# Patient Record
Sex: MALE | Race: BLACK OR AFRICAN AMERICAN | NOT HISPANIC OR LATINO | Employment: FULL TIME | URBAN - METROPOLITAN AREA
[De-identification: names, ages, dates, MRNs, and addresses within clinical notes are randomized per-mention and may not be internally consistent; named-entity substitution may affect disease eponyms.]

---

## 2024-06-21 ENCOUNTER — HOSPITAL ENCOUNTER (EMERGENCY)
Facility: CLINIC | Age: 59
Discharge: HOME | End: 2024-06-21
Attending: EMERGENCY MEDICINE | Admitting: EMERGENCY MEDICINE
Payer: COMMERCIAL

## 2024-06-21 VITALS
TEMPERATURE: 97.6 F | OXYGEN SATURATION: 99 % | HEART RATE: 56 BPM | SYSTOLIC BLOOD PRESSURE: 195 MMHG | DIASTOLIC BLOOD PRESSURE: 99 MMHG | RESPIRATION RATE: 18 BRPM

## 2024-06-21 DIAGNOSIS — R40.0 SOMNOLENCE: ICD-10-CM

## 2024-06-21 PROCEDURE — 99283 EMERGENCY DEPT VISIT LOW MDM: CPT

## 2024-06-21 PROCEDURE — 99282 EMERGENCY DEPT VISIT SF MDM: CPT

## 2024-06-21 ASSESSMENT — COLUMBIA-SUICIDE SEVERITY RATING SCALE - C-SSRS
6. HAVE YOU EVER DONE ANYTHING, STARTED TO DO ANYTHING, OR PREPARED TO DO ANYTHING TO END YOUR LIFE?: NO
1. IN THE PAST MONTH, HAVE YOU WISHED YOU WERE DEAD OR WISHED YOU COULD GO TO SLEEP AND NOT WAKE UP?: NO
2. HAVE YOU ACTUALLY HAD ANY THOUGHTS OF KILLING YOURSELF IN THE PAST MONTH?: NO

## 2024-06-21 ASSESSMENT — ACTIVITIES OF DAILY LIVING (ADL): ADLS_ACUITY_SCORE: 35

## 2024-06-21 NOTE — ED PROVIDER NOTES
Emergency Department Note      History of Present Illness     Chief Complaint  Altered Mental Status    HPI  Syed Parekh is a 58 year old male who presents to the ER for syncope. 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 He drank earlier today, smoked some weed,  and has not eaten anything. He took no other drugs. Syed denies symptoms before and after syncope. He recalls this happening 12 years ago. Of note, patient was at a hospital in [place] a month ago for an abscess that went away with medication.     Independent Historian  None    Review of External Notes  None available  Past Medical History   Medical History and Problem List  The patient denies any significant past medical history.    Medications  The patient is not currently taking any regular medications.   Physical Exam   Patient Vitals for the past 24 hrs:   BP Temp Temp src Pulse Resp SpO2   06/21/24 1745 (!) 195/99 97.6  F (36.4  C) Temporal 56 18 99 %     Physical Exam  General: Disheveled middle-age gentleman resting comfortably in the chair, interacting appropriately.    Eye:  Pupils are equal, round, and reactive.  Extraocular movements intact.    ENT:  No rhinorrhea.  Moist mucus membranes.  Normal tongue and tonsil.    Cardiac:  Regular rate and rhythm.  No murmurs, gallops, or rubs.    Pulmonary:  Clear to auscultation bilaterally.  No wheezes, rales, or rhonchi.    Abdomen:  Positive bowel sounds.  Abdomen is soft and non-distended, without focal tenderness.    Musculoskeletal:  Normal movement of all extremities without evidence for deficit.    Skin:  Warm and dry without rashes.    Neurologic:  Non-focal exam without asymmetric weakness or numbness.     Psychiatric:  Normal affect with appropriate interaction with examiner.    Diagnostics   Lab Results   Labs Ordered and Resulted from Time of ED Arrival to Time of ED Departure - No data to display    Imaging  No orders  "to display       Independent Interpretation  None  ED Course    Medications Administered  Medications - No data to display      Discussion of Management  None    Social Determinants of Health adding to complexity of care  Homelessness/Housing Insecurity    ED Course  ED Course as of 06/21/24 2340   Fri Jun 21, 2024   4668 I obtained the history and examined the patient as noted above.      Medical Decision Making / Diagnosis   CMS Diagnoses: None    MIPS     None    MDM  Syed Parekh is a 58 year old male presenting to us from a local [place] where the patient was found sleeping on the ground.  The patient states that he has a job there and remembers moving a car from 1 place to another.  He is not entirely clear how he ended up on the ground.  However, he does not feel that he likely suffered any trauma.  He did admit to drinking beer and smoking \"L blunt\" earlier today.  He came to immediately, but there was concern that this was a syncopal spell and therefore EMS was called and the patient was transported to us.    The time of my assessment, the patient is resting comfortably in the chair.  He has no complaints for me.  He is frustrated that he is here.  He states that \"I feel absolutely fine.\"  He states that he had blood work done \"in [place]\" last month and everything is \"fine.\"  I explained to him that I would recommend an EKG and basic blood test to make sure this did not represent a cardiac dysrhythmia, anemia, electrolyte disturbance, etc.  The patient is adamant with me that he does not want to have any further testing done today.  I do believe that he has capacity to understand that I have significant diagnostic uncertainty regarding his visit today.  However, he has every right to refuse care and I will discharge him with his otherwise reassuring vital signs and normal physical exam.  He was advised to return to us immediately if he changes his mind about a workup or if he has any other emergent " concerns.    Disposition  The patient was discharged.     ICD-10 Codes:    ICD-10-CM    1. Somnolence  R40.0            Discharge Medications  There are no discharge medications for this patient.        Scribe Disclosure:  I, Arnaud Dempsey, am serving as a scribe at 6:01 PM on 6/21/2024 to document services personally performed by Trierweiler, Chad A, MD based on my observations and the provider's statements to me.        Trierweiler, Chad A, MD  06/21/24 4019

## 2024-06-21 NOTE — ED TRIAGE NOTES
[place] em[place] and found asleep/unconscious in car wash not in a car. No trauma noted or injuries. Alert oriented x4          HTN and bradycardia to 49 present for ems

## 2024-06-21 NOTE — DISCHARGE INSTRUCTIONS
As discussed, I am unsure as to why you passed out today.  I recommended checking an EKG and blood tests and observing you for a period of time.  Instead, you have elected not to pursue any testing, desiring to leave the emergency department.  Please do not hesitate to return to us if you change your mind about having a further workup for this to try to determine what occurred.      Discharge Instructions  Syncope    Syncope (fainting) is a sudden, short loss of consciousness (passing out spell). People will usually fall to the ground when they faint or slump over if seated.  People may also shake when this happens, and it can sometimes be difficult to tell the difference between syncope and a seizure. At this time, your provider does not find a reason to suspect that your fainting spell is a sign of anything dangerous or life-threatening.  However, sometimes the signs of serious illness do not show up right away.     Generally, every Emergency Department visit should have a follow-up clinic visit with either a primary or a specialty clinic/provider. Please follow-up as instructed by your emergency provider today.    Return to the Emergency Department if:  You faint again.   You have any significant bleeding.  You have chest pain or a fast or irregular heartbeat.  You feel short of breath.  You cough up any blood.  You have abdominal (belly) pain or unusual back pain.  You have ongoing vomiting (throwing up) or diarrhea (loose stools).  You have a black or tarry bowel movement, or blood in the stool or in your vomit.  You have a fever over 101 F.  You lose feeling or cannot move a part of your body or cannot talk normally.  You are confused, have a headache, cannot see well, or have a seizure.  DO NOT DRIVE. CALL 911 INSTEAD!    What can I do to help myself?  Follow any specific instructions that your provider discussed with you.  If you feel light-headed, make sure to sit down right away, even if you have to sit on  the floor.  Follow up with your regular medical provider as discussed for further management. This may include lowering your blood pressure medications, insulin or other diabetic medications, checking your blood sugar more frequently, and drinking more fluids, taking medicines for vomiting or diarrhea or getting up slower.  If you were given a prescription for medicine here today, be sure to read all of the information (including the package insert) that comes with your prescription.  This will include important information about the medicine, its side effects, and any warnings that you need to know about.  The pharmacist who fills the prescription can provide more information and answer questions you may have about the medicine.  If you have questions or concerns that the pharmacist cannot address, please call or return to the Emergency Department.   Remember that you can always come back to the Emergency Department if you are not able to see your regular provider in the amount of time listed above, if you get any new symptoms, or if there is anything that worries you.